# Patient Record
Sex: FEMALE | Race: OTHER | HISPANIC OR LATINO | URBAN - METROPOLITAN AREA
[De-identification: names, ages, dates, MRNs, and addresses within clinical notes are randomized per-mention and may not be internally consistent; named-entity substitution may affect disease eponyms.]

---

## 2017-03-21 ENCOUNTER — EMERGENCY (EMERGENCY)
Facility: HOSPITAL | Age: 32
LOS: 1 days | Discharge: HOME | End: 2017-03-21
Attending: EMERGENCY MEDICINE
Payer: COMMERCIAL

## 2017-03-21 VITALS
WEIGHT: 169.98 LBS | TEMPERATURE: 99 F | SYSTOLIC BLOOD PRESSURE: 117 MMHG | HEART RATE: 90 BPM | OXYGEN SATURATION: 98 % | RESPIRATION RATE: 18 BRPM | HEIGHT: 61 IN | DIASTOLIC BLOOD PRESSURE: 66 MMHG

## 2017-03-21 VITALS
RESPIRATION RATE: 18 BRPM | DIASTOLIC BLOOD PRESSURE: 74 MMHG | HEART RATE: 82 BPM | TEMPERATURE: 98 F | SYSTOLIC BLOOD PRESSURE: 121 MMHG | OXYGEN SATURATION: 99 %

## 2017-03-21 DIAGNOSIS — M54.17 RADICULOPATHY, LUMBOSACRAL REGION: ICD-10-CM

## 2017-03-21 LAB — HCG UR QL: NEGATIVE — SIGNIFICANT CHANGE UP

## 2017-03-21 PROCEDURE — 99283 EMERGENCY DEPT VISIT LOW MDM: CPT | Mod: 25

## 2017-03-21 PROCEDURE — 96372 THER/PROPH/DIAG INJ SC/IM: CPT

## 2017-03-21 PROCEDURE — 72110 X-RAY EXAM L-2 SPINE 4/>VWS: CPT

## 2017-03-21 PROCEDURE — 99283 EMERGENCY DEPT VISIT LOW MDM: CPT

## 2017-03-21 PROCEDURE — 72110 X-RAY EXAM L-2 SPINE 4/>VWS: CPT | Mod: 26

## 2017-03-21 PROCEDURE — 81025 URINE PREGNANCY TEST: CPT

## 2017-03-21 RX ORDER — DIAZEPAM 5 MG
5 TABLET ORAL ONCE
Qty: 0 | Refills: 0 | Status: DISCONTINUED | OUTPATIENT
Start: 2017-03-21 | End: 2017-03-21

## 2017-03-21 RX ORDER — IBUPROFEN 200 MG
1 TABLET ORAL
Qty: 20 | Refills: 0 | OUTPATIENT
Start: 2017-03-21 | End: 2017-04-20

## 2017-03-21 RX ORDER — KETOROLAC TROMETHAMINE 30 MG/ML
30 SYRINGE (ML) INJECTION ONCE
Qty: 0 | Refills: 0 | Status: DISCONTINUED | OUTPATIENT
Start: 2017-03-21 | End: 2017-03-21

## 2017-03-21 RX ORDER — METHOCARBAMOL 500 MG/1
2 TABLET, FILM COATED ORAL
Qty: 24 | Refills: 0 | OUTPATIENT
Start: 2017-03-21 | End: 2017-03-24

## 2017-03-21 RX ADMIN — Medication 30 MILLIGRAM(S): at 16:49

## 2017-03-21 RX ADMIN — Medication 5 MILLIGRAM(S): at 15:23

## 2017-03-21 RX ADMIN — Medication 30 MILLIGRAM(S): at 15:23

## 2017-03-21 NOTE — ED PROVIDER NOTE - OBJECTIVE STATEMENT
No pMH c/o rt lower back pain radiating down rt leg on and off for 1 week. Worse with movement. Feels numbness/tingling to leg as well. No weakness/trauma or bowel/bladder symptoms. Did not take any meds.

## 2017-04-07 ENCOUNTER — APPOINTMENT (OUTPATIENT)
Dept: ORTHOPEDIC SURGERY | Facility: CLINIC | Age: 32
End: 2017-04-07

## 2017-05-05 ENCOUNTER — APPOINTMENT (OUTPATIENT)
Dept: ORTHOPEDIC SURGERY | Facility: CLINIC | Age: 32
End: 2017-05-05

## 2017-05-05 VITALS — BODY MASS INDEX: 32.1 KG/M2 | HEIGHT: 61 IN | WEIGHT: 170 LBS

## 2017-05-05 RX ORDER — METHYLPREDNISOLONE 4 MG/1
4 TABLET ORAL
Qty: 1 | Refills: 0 | Status: ACTIVE | COMMUNITY
Start: 2017-05-05 | End: 1900-01-01

## 2017-05-19 ENCOUNTER — APPOINTMENT (OUTPATIENT)
Dept: ORTHOPEDIC SURGERY | Facility: CLINIC | Age: 32
End: 2017-05-19

## 2017-05-19 DIAGNOSIS — M54.16 RADICULOPATHY, LUMBAR REGION: ICD-10-CM

## 2017-05-19 DIAGNOSIS — M51.26 OTHER INTERVERTEBRAL DISC DISPLACEMENT, LUMBAR REGION: ICD-10-CM

## 2017-05-19 DIAGNOSIS — M48.07 SPINAL STENOSIS, LUMBOSACRAL REGION: ICD-10-CM

## 2017-05-19 DIAGNOSIS — M51.36 OTHER INTERVERTEBRAL DISC DEGENERATION, LUMBAR REGION: ICD-10-CM

## 2017-05-20 PROBLEM — M51.36 DISC DEGENERATION, LUMBAR: Status: ACTIVE | Noted: 2017-05-20

## 2017-05-20 PROBLEM — M54.16 LUMBAR RADICULOPATHY: Status: ACTIVE | Noted: 2017-05-05

## 2017-05-20 PROBLEM — M48.07 LUMBOSACRAL STENOSIS: Status: ACTIVE | Noted: 2017-05-20

## 2017-05-20 PROBLEM — M51.26 LUMBAR HERNIATED DISC: Status: ACTIVE | Noted: 2017-05-20

## 2017-06-05 ENCOUNTER — APPOINTMENT (OUTPATIENT)
Dept: OBGYN | Facility: CLINIC | Age: 32
End: 2017-06-05

## 2017-08-02 ENCOUNTER — CHART COPY (OUTPATIENT)
Age: 32
End: 2017-08-02

## 2017-08-14 ENCOUNTER — APPOINTMENT (OUTPATIENT)
Dept: ORTHOPEDIC SURGERY | Facility: CLINIC | Age: 32
End: 2017-08-14
